# Patient Record
Sex: FEMALE | Race: WHITE
[De-identification: names, ages, dates, MRNs, and addresses within clinical notes are randomized per-mention and may not be internally consistent; named-entity substitution may affect disease eponyms.]

---

## 2020-02-15 ENCOUNTER — HOSPITAL ENCOUNTER (EMERGENCY)
Dept: HOSPITAL 50 - VM.ED | Age: 25
Discharge: HOME | End: 2020-02-15
Payer: COMMERCIAL

## 2020-02-15 VITALS — DIASTOLIC BLOOD PRESSURE: 78 MMHG | HEART RATE: 80 BPM | SYSTOLIC BLOOD PRESSURE: 114 MMHG

## 2020-02-15 DIAGNOSIS — F12.988: Primary | ICD-10-CM

## 2020-02-15 DIAGNOSIS — Z88.1: ICD-10-CM

## 2020-02-15 DIAGNOSIS — F17.210: ICD-10-CM

## 2020-02-15 DIAGNOSIS — R11.2: ICD-10-CM

## 2020-02-15 LAB
ANION GAP SERPL CALC-SCNC: 14.5 MMOL/L (ref 10–20)
CHLORIDE SERPL-SCNC: 103 MMOL/L (ref 98–107)
SODIUM SERPL-SCNC: 140 MMOL/L (ref 136–145)

## 2020-02-16 NOTE — CR
______________________________________________________________________________   

  

[id number] RAD/RAD Abd Flat and Upright 2V  

EXAM: ABDOMEN 2 VIEWS  

   

 INDICATION:  ABDOMINAL PAIN.  

   

 COMPARISON:  None.  

   

 DISCUSSION: A couple of mildly prominent small bowel loops in the left upper  

 quadrant which are nonspecific, but could be seen in the localized ileus. Gas is  

 seen throughout a normal-appearing colon. Follow-up radiographs may be useful if  

 symptoms persist.  

   

 No pathologic calcifications or osseous lesions are seen.   

   

 IMPRESSION:  

 1.  Nonspecific bowel gas pattern.  

   

 Electronically signed by Jose Cruz Corona MD on 2/16/2020 9:16 AM  

   

  

Jose Cruz Corona MD                 

 02/16/20 0918    

  

Thank you for allowing us to participate in the care of your patient.

## 2020-02-17 ENCOUNTER — HOSPITAL ENCOUNTER (EMERGENCY)
Dept: HOSPITAL 50 - VM.ED | Age: 25
Discharge: HOME | End: 2020-02-17
Payer: COMMERCIAL

## 2020-02-17 VITALS — HEART RATE: 70 BPM | SYSTOLIC BLOOD PRESSURE: 111 MMHG | DIASTOLIC BLOOD PRESSURE: 67 MMHG

## 2020-02-17 DIAGNOSIS — Z79.899: ICD-10-CM

## 2020-02-17 DIAGNOSIS — F12.988: Primary | ICD-10-CM

## 2020-02-17 DIAGNOSIS — F32.9: ICD-10-CM

## 2020-02-17 DIAGNOSIS — Z88.1: ICD-10-CM

## 2020-02-17 DIAGNOSIS — R11.2: ICD-10-CM

## 2020-02-17 DIAGNOSIS — F41.9: ICD-10-CM

## 2020-02-17 DIAGNOSIS — F17.210: ICD-10-CM

## 2020-02-17 LAB
ANION GAP SERPL CALC-SCNC: 15.5 MMOL/L (ref 10–20)
CHLORIDE SERPL-SCNC: 108 MMOL/L (ref 98–107)
SODIUM SERPL-SCNC: 142 MMOL/L (ref 136–145)

## 2020-02-17 PROCEDURE — 80053 COMPREHEN METABOLIC PANEL: CPT

## 2020-02-17 PROCEDURE — 83735 ASSAY OF MAGNESIUM: CPT

## 2020-02-17 PROCEDURE — 85610 PROTHROMBIN TIME: CPT

## 2020-02-17 PROCEDURE — 84100 ASSAY OF PHOSPHORUS: CPT

## 2020-02-17 PROCEDURE — 96376 TX/PRO/DX INJ SAME DRUG ADON: CPT

## 2020-02-17 PROCEDURE — 86140 C-REACTIVE PROTEIN: CPT

## 2020-02-17 PROCEDURE — 99284 EMERGENCY DEPT VISIT MOD MDM: CPT

## 2020-02-17 PROCEDURE — C9113 INJ PANTOPRAZOLE SODIUM, VIA: HCPCS

## 2020-02-17 PROCEDURE — 85025 COMPLETE CBC W/AUTO DIFF WBC: CPT

## 2020-02-17 PROCEDURE — 96361 HYDRATE IV INFUSION ADD-ON: CPT

## 2020-02-17 PROCEDURE — 96374 THER/PROPH/DIAG INJ IV PUSH: CPT

## 2020-02-17 PROCEDURE — 96375 TX/PRO/DX INJ NEW DRUG ADDON: CPT

## 2020-02-17 NOTE — EDM.PDOC
ED HPI GENERAL MEDICAL PROBLEM





- General


Chief Complaint: Gastrointestinal Problem


Stated Complaint: abdominal pain, nausea and vomitting 


Time Seen by Provider: 02/17/20 09:30


Source of Information: Reports: Patient, Family


History Limitations: Reports: No Limitations





- History of Present Illness


INITIAL COMMENTS - FREE TEXT/NARRATIVE: 





Patient presents to the ER from the clinic with hyperemesis. Patient states she 

smokes marijuana yesterday and started feeling nausea, forceful retching and 

abdominal pain. It has progressed and the patient states her abdominal pain is 

unbearable. The mother is present at bedside. Mother states patient was seen on 

Friday at the [facility] for the same symptoms. After being diagnosed with 

cannabinoid induced hyperemesis that patient still went home and smoked more 

marijuana. The patient has had an intensive workup for the nausea, vomiting and 

abdominal pain, including CT abdomen and pelvis and EGD. No acute or pathologic 

findings. Patient is very anxious and persistent on receiving IV narcotic pain 

medication. Patient denies fever, chills, chest pain, diarrhea, constipation, 

rash.  


Onset: Today


Onset Date: 02/16/20


Onset Time: 12:00


Duration: Getting Worse


Location: Reports: Abdomen


Quality: Reports: Ache, Stabbing


Severity: Moderate


Improves with: Reports: None


Worsens with: Reports: None


Associated Symptoms: Reports: Nausea/Vomiting


  ** Upper Abdominal


Pain Score (Numeric/FACES): 10





- Related Data


 Allergies











Allergy/AdvReac Type Severity Reaction Status Date / Time


 


azithromycin [From Zithromax] Allergy  Rash Verified 02/17/20 10:08











Home Meds: 


 Home Meds





ARIPiprazole [Aripiprazole] 2 mg DAILY 02/17/20 [History]


Escitalopram [Lexapro] 20 mg DAILY 02/17/20 [History]


Ondansetron [Zofran ODT] 4 mg Q4H PRN 02/17/20 [History]


hydrOXYzine HCL [hydrOXYzine] 25 mg QID PRN 02/17/20 [History]


polyethylene glycoL 3350 [MiraLAX] 3 tsp TID PRN 02/17/20 [History]











Past Medical History


Gastrointestinal History: Reports: GERD, Other (See Below)


Other Gastrointestinal History: cannaboid hyperemesis


Psychiatric History: Reports: Anxiety, Depression





- Past Surgical History


GI Surgical History: Reports: None





Social & Family History





- Tobacco Use


Smoking Status *Q: Current Every Day Smoker


Years of Tobacco use: 10


Packs/Tins Daily: 1





- Recreational Drug Use


Recreational Drug Use: Yes


Drug Use in Last 12 Months: Yes


Recreational Drug Type: Reports: Marijuana/Hashish


Recreational Drug Use Frequency: Daily





ED ROS GENERAL





- Review of Systems


Review Of Systems: See Below


Constitutional: Reports: No Symptoms, Decreased Appetite.  Denies: Fever, Chills

, Malaise, Weakness


HEENT: Reports: No Symptoms


Respiratory: Reports: No Symptoms.  Denies: Shortness of Breath, Wheezing, Cough

, Sputum


Cardiovascular: Reports: No Symptoms


Endocrine: Reports: No Symptoms


GI/Abdominal: Reports: Abdominal Pain, Nausea, Vomiting.  Denies: Black Stool, 

Constipation, Diarrhea, Distension, Flatus


: Reports: No Symptoms


Musculoskeletal: Reports: No Symptoms


Skin: Reports: No Symptoms.  Denies: Cyanosis, Jaundice, Rash


Neurological: Reports: No Symptoms.  Denies: Headache, Tremors, Trouble Speaking

, Weakness


Psychiatric: Reports: Agitation, Anxiety


Hematologic/Lymphatic: Reports: No Symptoms


Immunologic: Reports: No Symptoms





ED EXAM, GENERAL





- Physical Exam


Exam: See Below


Exam Limited By: No Limitations


General Appearance: Alert, Anxious, Moderate Distress


Eye Exam: Bilateral Eye: EOMI, PERRL


Ears: Normal External Exam, Normal Canal


Ear Exam: Bilateral Ear: Auricle Normal


Nose: Normal Inspection, Normal Mucosa, No Blood


Throat/Mouth: Normal Inspection, Normal Lips, Normal Teeth, Normal Oropharynx, 

No Airway Compromise


Head: Atraumatic, Normocephalic


Neck: Normal Inspection, Supple, Non-Tender, Full Range of Motion


Respiratory/Chest: No Respiratory Distress, Lungs Clear, Normal Breath Sounds, 

Chest Non-Tender


Cardiovascular: Normal Peripheral Pulses, Regular Rate, Rhythm, No Edema, No 

Murmur


Peripheral Pulses: 2+: Radial (L), Radial (R)


GI/Abdominal: Normal Bowel Sounds, Soft, Non-Tender, No Organomegaly, No 

Distention, No Abnormal Bruit, No Mass


 (Female) Exam: Deferred


Rectal (Female) Exam: Deferred


Back Exam: Normal Inspection, Full Range of Motion


Extremities: Normal Inspection, Normal Range of Motion, No Pedal Edema, Normal 

Capillary Refill


Neurological: Alert, Oriented, CN II-XII Intact, Normal Cognition, Normal Gait, 

Normal Reflexes, No Motor/Sensory Deficits


Psychiatric: Normal Affect, Anxious


Skin Exam: Warm, Dry, Intact, Normal Color, No Rash


Lymphatic: No Adenopathy





Course





- Vital Signs


Last Recorded V/S: 


 Last Vital Signs











Temp  36.1 C   02/17/20 09:25


 


Pulse  70   02/17/20 09:25


 


Resp  24 H  02/17/20 09:25


 


BP  111/67   02/17/20 09:25


 


Pulse Ox  97   02/17/20 09:25














- Orders/Labs/Meds


Orders: 


 Active Orders 24 hr











 Category Date Time Status


 


 DRUG SCREEN, URINE [URCHEM] Stat Lab  02/17/20 09:30 Ordered


 


 HCG URINE, POC [POC] Stat Lab  02/17/20 09:31 Ordered


 


 Peripheral IV Insertion Adult [OM.PC] Routine Oth  02/17/20 09:21 Ordered











Labs: 


 Laboratory Tests











  02/17/20 02/17/20 02/17/20 Range/Units





  09:35 09:35 09:35 


 


WBC  9.5    (4.0-10.0)  x10^3/uL


 


RBC  5.17    (4.00-5.50)  x10^6/uL


 


Hgb  15.1    (12.0-16.0)  g/dL


 


Hct  44.0    (33.0-47.0)  %


 


MCV  85.1    (78.0-93.0)  fL


 


MCH  29.2    (26.0-32.0)  pg


 


MCHC  34.3    (32.0-36.0)  g/dL


 


RDW Coeff of Aditya  12.4    (10.0-15.0)  %


 


Plt Count  300    (130-400)  x10^3/uL


 


Neut % (Auto)  69.6    (50.0-80.0)  %


 


Lymph % (Auto)  23.5 L    (25.0-50.0)  %


 


Mono % (Auto)  5.6    (2.0-11.0)  %


 


Eos % (Auto)  1.2    (0.0-4.0)  %


 


Baso % (Auto)  0.1 L    (0.2-1.2)  %


 


PT   10.9   (10.0-12.8)  SEC


 


INR   1.0 L   (2.0-3.5)  


 


Sodium    142  (136-145)  mmol/L


 


Potassium    3.5  (3.5-5.1)  mmol/L


 


Chloride    108 H  ()  mmol/L


 


Carbon Dioxide    22  (21-32)  mmol/L


 


Anion Gap    15.5  (10-20)  mmol/L


 


BUN    12  (7-18)  mg/dL


 


Creatinine    1.0  (0.55-1.02)  mg/dL


 


Est Cr Clr Drug Dosing    TNP  


 


Estimated GFR (MDRD)    > 60  


 


Glucose    100  ()  mg/dL


 


Calcium    9.2  (8.5-10.1)  mg/dL


 


Corrected Calcium    9.52  (8.5-10.1)  mg/dL


 


Phosphorus    2.7  (2.6-4.7)  mg/dL


 


Magnesium    2.0  (1.8-2.4)  mg/dL


 


Total Bilirubin    0.5  (0.2-1.0)  mg/dL


 


AST    19  (15-37)  U/L


 


ALT    24  (14-59)  U/L


 


Alkaline Phosphatase    117 H  ()  U/L


 


C-Reactive Protein    < 0.2  (<=0.9)  mg/dL


 


Total Protein    7.3  (6.4-8.2)  g/dL


 


Albumin    3.6  (3.4-5.0)  g/dL


 


Globulin    3.7  


 


Albumin/Globulin Ratio    0.97  











Meds: 


Medications














Discontinued Medications














Generic Name Dose Route Start Last Admin





  Trade Name Freq  PRN Reason Stop Dose Admin


 


Haloperidol Lactate  10 mg  02/17/20 09:29  02/17/20 09:41





  Haldol  IV  02/17/20 09:30  10 mg





  ONETIME ONE   Administration





     





     





     





     


 


Sodium Chloride  1,000 mls @ 1,000 mls/hr  02/17/20 09:23  02/17/20 09:39





  Normal Saline  IV  02/17/20 10:22  1,000 mls/hr





  .BOLUS ONE   Administration





     





     





     





     


 


Ketorolac Tromethamine  15 mg  02/17/20 09:57  02/17/20 10:07





  Toradol  IVPUSH  02/17/20 09:58  15 mg





  ONETIME ONE   Administration





     





     





     





     


 


Lorazepam  1 mg  02/17/20 09:29  02/17/20 09:39





  Ativan  IVPUSH  02/17/20 09:30  1 mg





  STAT ONE   Administration





     





     





     





     


 


Lorazepam  1 mg  02/17/20 10:58  02/17/20 11:05





  Ativan  IVPUSH  02/17/20 10:59  1 mg





  STAT ONE   Administration





     





     





     





     


 


Metoclopramide HCl  10 mg  02/17/20 10:57  02/17/20 11:08





  Reglan  IVPUSH  02/17/20 10:58  10 mg





  ONETIME ONE   Administration





     





     





     





     


 


Pantoprazole Sodium  80 mg  02/17/20 09:28  02/17/20 09:43





  Protonix Iv***  IVPUSH  02/17/20 09:29  80 mg





  ONETIME ONE   Administration





     





     





     





     


 


Prochlorperazine Edisylate  10 mg  02/17/20 09:26  





  Compazine  IV  02/17/20 09:27  





  ONETIME ONE   





     





     





     





     


 


Sodium Chloride  10 ml  02/17/20 09:21  





  Saline Flush  FLUSH   





  ASDIRECTED PRN   





  Keep Vein Open   





     





     





     














- Re-Assessments/Exams


Free Text/Narrative Re-Assessment/Exam: 





02/17/20 12:30


After IV medication, nausea and retching had improved, patient was sleeping


When patient would waken, then would start to retch





Departure





- Departure


Time of Disposition: 12:15


Disposition: Home, Self-Care 01


Clinical Impression: 


 Marijuana use





Nausea & vomiting


Qualifiers:


 Vomiting Intractability: intractable 








- Discharge Information


Instructions:  Cannabis Use Disorder


Referrals: 


PCP,Unknown [Primary Care Provider] - 


Forms:  ED Department Discharge


Additional Instructions: 


Stop using marijuana





Try to sleep this afternoon





Promethazine suppository 25mg


1 every 6 hours as needed for nausea/vomiting





Zofran ODT4mg


1 dissolved in mouth every 6 hours 





Sepsis Event Note





- Evaluation


Sepsis Screening Result: No Definite Risk





- Focused Exam


Vital Signs: 


 Vital Signs











  Temp Pulse Resp BP Pulse Ox


 


 02/17/20 09:25  36.1 C  70  24 H  111/67  97











Date Exam was Performed: 02/17/20


Time Exam was Performed: 14:07





- My Orders


Last 24 Hours: 


My Active Orders





02/17/20 09:21


Peripheral IV Insertion Adult [OM.PC] Routine 





02/17/20 09:30


DRUG SCREEN, URINE [URCHEM] Stat 





02/17/20 09:31


HCG URINE, POC [POC] Stat 














- Assessment/Plan


Last 24 Hours: 


My Active Orders





02/17/20 09:21


Peripheral IV Insertion Adult [OM.PC] Routine 





02/17/20 09:30


DRUG SCREEN, URINE [URCHEM] Stat 





02/17/20 09:31


HCG URINE, POC [POC] Stat 











Plan: 





Stop using marijuana





Try to sleep this afternoon





Promethazine suppository 25mg


1 every 6 hours as needed for nausea/vomiting





Zofran ODT4mg


1 dissolved in mouth every 6 hours

## 2020-02-19 ENCOUNTER — HOSPITAL ENCOUNTER (EMERGENCY)
Dept: HOSPITAL 50 - VM.ED | Age: 25
Discharge: HOME | End: 2020-02-19
Payer: COMMERCIAL

## 2020-02-19 VITALS — SYSTOLIC BLOOD PRESSURE: 131 MMHG | HEART RATE: 54 BPM | DIASTOLIC BLOOD PRESSURE: 84 MMHG

## 2020-02-19 DIAGNOSIS — F32.9: ICD-10-CM

## 2020-02-19 DIAGNOSIS — F17.210: ICD-10-CM

## 2020-02-19 DIAGNOSIS — Z79.899: ICD-10-CM

## 2020-02-19 DIAGNOSIS — Z88.1: ICD-10-CM

## 2020-02-19 DIAGNOSIS — F41.9: ICD-10-CM

## 2020-02-19 DIAGNOSIS — F12.988: Primary | ICD-10-CM

## 2020-02-19 NOTE — EDM.PDOC
ED HPI GENERAL MEDICAL PROBLEM





- General


Chief Complaint: Gastrointestinal Problem


Stated Complaint: vomiting


Time Seen by Provider: 02/19/20 04:13


Source of Information: Reports: Patient, Family


History Limitations: Reports: No Limitations





- History of Present Illness


INITIAL COMMENTS - FREE TEXT/NARRATIVE: 


Patient states she has had chronic nausea and vomiting ongoing now for over a 

week after being seen at multiple locations [facility] in [place] and here diagnosed 

with cannabinoid hyperemesis syndrome.  Patient states last time she smoked 

anything was Saturday she states she has been having ongoing vomiting all day 

today probably 15 times she has been trying Cokes crackers toast juice but not 

keeping anything down she says she has Phenergan suppositories at home and 

Zofran ODT but still having issues.  She denies any abdominal pain or diarrhea 

just a chronic nausea feeling and continuous vomiting.








She has had extensive work-up CT abdomen pelvis EGD GI consults multiple ER 

visits all of been negative she was seen here on 2 17 with a negative CBC 

negative hCG normal exam





Duration: Day(s):


Location: Reports: Abdomen


Quality: Reports: Other (No pain)


Associated Symptoms: Reports: Nausea/Vomiting


  ** Abdomen


Pain Score (Numeric/FACES): 3





- Related Data


 Allergies











Allergy/AdvReac Type Severity Reaction Status Date / Time


 


azithromycin [From Zithromax] Allergy  Rash Verified 02/19/20 03:58











Home Meds: 


 Home Meds





ARIPiprazole [Aripiprazole] 2 mg DAILY 02/17/20 [History]


Escitalopram [Lexapro] 20 mg DAILY 02/17/20 [History]


Ondansetron [Zofran ODT] 4 mg Q4H PRN 02/17/20 [History]


hydrOXYzine HCL [hydrOXYzine] 25 mg QID PRN 02/17/20 [History]


polyethylene glycoL 3350 [MiraLAX] 3 tsp TID PRN 02/17/20 [History]











Past Medical History


Gastrointestinal History: Reports: GERD, Other (See Below)


Other Gastrointestinal History: cannaboid hyperemesis


Psychiatric History: Reports: Anxiety, Depression





- Past Surgical History


GI Surgical History: Reports: None





Social & Family History





- Tobacco Use


Smoking Status *Q: Current Every Day Smoker


Years of Tobacco use: 8


Packs/Tins Daily: 1





ED ROS GENERAL





- Review of Systems


Review Of Systems: See Below


Constitutional: Reports: No Symptoms.  Denies: Fever, Chills, Malaise, Weakness

, Fatigue


HEENT: Reports: No Symptoms


Respiratory: Reports: No Symptoms.  Denies: Shortness of Breath, Cough


Cardiovascular: Reports: No Symptoms.  Denies: Lightheadedness, Palpitations, 

Syncope


Endocrine: Reports: No Symptoms


GI/Abdominal: Reports: Nausea, Vomiting (Patient states last time she vomited 

was about an hour ago), Other (Last bowel movement was 2 days ago).  Denies: 

Abdominal Pain, Bloody Stool, Constipation, Diarrhea


: Reports: No Symptoms


Musculoskeletal: Reports: No Symptoms


Skin: Reports: No Symptoms


Neurological: Reports: No Symptoms


Psychiatric: Reports: No Symptoms


Hematologic/Lymphatic: Reports: No Symptoms


Immunologic: Reports: No Symptoms





ED EXAM, GI/ABD





- Physical Exam


Exam: See Below


Exam Limited By: No Limitations


General Appearance: Alert, WD/WN, No Apparent Distress, Other (Patient sitting 

on the bed crosslegged no acute distress laughing with mother upon walking into 

the room)


Eyes: Bilateral: Normal Appearance


Ears: Hearing Grossly Normal


Nose: Normal Inspection, Normal Mucosa, No Blood


Throat/Mouth: Normal Inspection, Normal Lips, Normal Teeth, Normal Gums, Normal 

Oropharynx, Normal Voice, No Airway Compromise, Other (Moist mucous membranes 

normal skin turgor)


Head: Atraumatic, Normocephalic


Neck: Normal Inspection, Supple, Non-Tender, Full Range of Motion


Respiratory/Chest: No Respiratory Distress, Lungs Clear, Normal Breath Sounds, 

No Accessory Muscle Use, Chest Non-Tender


Cardiovascular: Normal Peripheral Pulses, Regular Rate, Rhythm, No Edema, No 

Gallop, No JVD, No Murmur, No Rub


GI/Abdominal Exam: Normal Bowel Sounds, Soft, Non-Tender, No Organomegaly, No 

Distention, Other (Patient has no tenderness palpation over the abdomen 

positive bowel sounds negative heel slap negative pelvic rock negative psoas 

negative obturator negative CVA tenderness palpation).  No: Guarding, Rigid, 

Rebound, Tender


Extremities: Normal Inspection, Normal Range of Motion, Non-Tender, No Pedal 

Edema, Normal Capillary Refill


Neurological: Alert, Oriented, CN II-XII Intact, Normal Cognition, Normal Gait, 

No Motor/Sensory Deficits, Other (All vital signs are stable she has a heart 

rate 54)


Psychiatric: Normal Affect, Normal Mood


Skin Exam: Warm, Dry, Intact, Normal Color, No Rash


Lymphatic: No Adenopathy





Course





- Vital Signs


Text/Narrative:: 





Records were reviewed from [facility] and here no acute findings were noted








Patient states she has Zofran ODT and Phenergan suppositories at home patient 

was instructed to drink Sprite Pedialyte Gatorade 7-Up ginger ale small amounts 

of fluids and advance as tolerated to eat crackers toast bananas advance as 

tolerated





Follow-up with your regular primary care provider in the next 24 to 48 hours


Last Recorded V/S: 





 Last Vital Signs











Temp  35.9 C L  02/19/20 03:59


 


Pulse  54 L  02/19/20 03:59


 


Resp  18   02/19/20 03:59


 


BP  131/84   02/19/20 03:59


 


Pulse Ox  100   02/19/20 03:59














Departure





- Departure


Time of Disposition: 04:30


Disposition: Home, Self-Care 01


Condition: Good


Clinical Impression: 


 Cannabinoid hyperemesis syndrome





Nausea & vomiting


Qualifiers:


 Vomiting Intractability: intractable 








- Discharge Information


*PRESCRIPTION DRUG MONITORING PROGRAM REVIEWED*: No


*COPY OF PRESCRIPTION DRUG MONITORING REPORT IN PATIENT KARI: No





Sepsis Event Note





- Evaluation


Sepsis Screening Result: No Definite Risk





- Focused Exam


Vital Signs: 





 Vital Signs











  Temp Pulse Resp BP Pulse Ox


 


 02/19/20 03:59  35.9 C L  54 L  18  131/84  100











Date Exam was Performed: 02/19/20


Time Exam was Performed: 04:19





- Problem List & Annotations


(1) Nausea & vomiting


SNOMED Code(s): 88198518


   Code(s): R11.2 - NAUSEA WITH VOMITING, UNSPECIFIED   Status: Acute   


Qualifiers: 


   Vomiting Intractability: intractable 





(2) Cannabinoid hyperemesis syndrome


SNOMED Code(s): 573156423, 766576689


   Code(s): F12.988 - CANNABIS USE, UNSP WITH OTHER CANNABIS-INDUCED DISORDER   

Status: Acute

## 2020-02-21 ENCOUNTER — HOSPITAL ENCOUNTER (EMERGENCY)
Dept: HOSPITAL 50 - VM.ED | Age: 25
LOS: 1 days | Discharge: HOME | End: 2020-02-22
Payer: COMMERCIAL

## 2020-02-21 VITALS — DIASTOLIC BLOOD PRESSURE: 95 MMHG | HEART RATE: 69 BPM | SYSTOLIC BLOOD PRESSURE: 135 MMHG

## 2020-02-21 DIAGNOSIS — F32.9: ICD-10-CM

## 2020-02-21 DIAGNOSIS — Z79.899: ICD-10-CM

## 2020-02-21 DIAGNOSIS — F41.9: ICD-10-CM

## 2020-02-21 DIAGNOSIS — K21.9: ICD-10-CM

## 2020-02-21 DIAGNOSIS — F12.188: Primary | ICD-10-CM

## 2020-02-21 DIAGNOSIS — R11.10: ICD-10-CM

## 2020-02-21 DIAGNOSIS — Z88.1: ICD-10-CM

## 2020-02-21 PROCEDURE — 99283 EMERGENCY DEPT VISIT LOW MDM: CPT

## 2020-02-21 PROCEDURE — 96372 THER/PROPH/DIAG INJ SC/IM: CPT

## 2020-02-21 NOTE — EDM.PDOC
ED HPI GENERAL MEDICAL PROBLEM





- General


Stated Complaint: Nausea, vomiting


Time Seen by Provider: 02/21/20 23:30


Source of Information: Reports: Patient, Family


History Limitations: Reports: No Limitations





- History of Present Illness


INITIAL COMMENTS - FREE TEXT/NARRATIVE: 





Patient presents to ER with complaints of intense nausea/vomiting and abdominal 

pain.  Has been having frequent issues like this for the last 2 months.  Has 

had multiple ER visits, [place] visits for same complaint.  She has had EGDs, 

CT scans, multiple lab tests.  Recently saw an internal medicine doctor and had 

a breath test for h. pylori. which was negative she states. The patient has 

been seen multiple times in the [place] ER for the same symptoms and ended up 

being discharged with cannabis hyperemesis and ended up going home and smoking 

more cannabis the same day. She returned to our ER shortly there after for 

similar symptoms again. Pt has been seen 3 times in this ER for similar issues 

and concerns.  Patient states last time she smoked anything was Saturday she 

states she has been having ongoing vomiting all day every day since then and 

estimates about 15 times per day.  She has been trying Cokes crackers toast 

juice but not keeping anything down she says she has Phenergan suppositories at 

home and Zofran ODT but still having issues. She does state she tries and takes 

large drinks for she is so thirsty at times and will end up becoming nauseated 

shortly after.  She states she also tries to induce vomiting when she does 

become nauseated. Mother states the patient was seen 2 times at the [facility] emergency room yesterday and was given Haldol and Benadryl the second 

visit with great relief and had not had any episodes of emesis for the last 12 

hours however started developing significant nausea and vomiting in the last 3 

hours.  Patient did take a suppository phenergan to try to help with symptoms.  

She also has been using a hot showers at least every hour.


Onset: Gradual


Quality: Reports: Ache, Throbbing


Severity: Moderate


Improves with: Reports: Other (warm shower )


Worsens with: Reports: Other (food, water, ), Movement


Treatments PTA: Reports: Other (see below) (Phenergan suppositories 9pm )





- Related Data


 Allergies











Allergy/AdvReac Type Severity Reaction Status Date / Time


 


azithromycin [From Zithromax] Allergy  Rash Verified 02/21/20 23:57











Home Meds: 


 Home Meds





ARIPiprazole [Aripiprazole] 2 mg DAILY 02/17/20 [History]


Escitalopram [Lexapro] 20 mg DAILY 02/17/20 [History]


Ondansetron [Zofran ODT] 4 mg Q4H PRN 02/17/20 [History]


hydrOXYzine HCL [hydrOXYzine] 25 mg QID PRN 02/17/20 [History]


polyethylene glycoL 3350 [MiraLAX] 3 tsp TID PRN 02/17/20 [History]


Ondansetron [Zofran ODT] 4 mg PO Q6H PRN 02/21/20 [History]


Promethazine [Phenadoz] 25 mg RECTAL Q6H PRN 02/21/20 [History]











Past Medical History


Gastrointestinal History: Reports: GERD, Other (See Below)


Other Gastrointestinal History: cannaboid hyperemesis


Psychiatric History: Reports: Anxiety, Depression





- Past Surgical History


GI Surgical History: Reports: None





ED ROS GENERAL





- Review of Systems


Review Of Systems: See Below


Constitutional: Reports: Decreased Appetite.  Denies: Fever, Chills, Malaise, 

Weakness, Night Sweats, Weight Gain


HEENT: Reports: No Symptoms


Respiratory: Reports: No Symptoms


Cardiovascular: Reports: No Symptoms


Endocrine: Reports: No Symptoms


GI/Abdominal: Reports: Abdominal Pain (muscle tenderness), Nausea, Vomiting.  

Denies: Black Stool, Bloody Stool, Constipation, Diarrhea, Decreased Appetite, 

Distension, Flatus, Hematemesis, Hematochezia, Melena, Mucous in Stool, Stool 

Incontinence


: Reports: No Symptoms


Musculoskeletal: Reports: No Symptoms


Skin: Reports: No Symptoms


Neurological: Reports: No Symptoms


Psychiatric: Reports: No Symptoms


Hematologic/Lymphatic: Reports: No Symptoms


Immunologic: Reports: No Symptoms





ED EXAM, GENERAL





- Physical Exam


Exam: See Below


Exam Limited By: No Limitations


General Appearance: Alert, WD/WN, No Apparent Distress


Eye Exam: Bilateral Eye: PERRL


Nose: Normal Inspection, Normal Mucosa


Throat/Mouth: Normal Inspection, Normal Lips


Head: Atraumatic, Normocephalic


Neck: Normal Inspection, Supple, Non-Tender, Full Range of Motion


Respiratory/Chest: No Respiratory Distress, Lungs Clear, Normal Breath Sounds, 

No Accessory Muscle Use, Chest Non-Tender


Cardiovascular: Normal Peripheral Pulses, Regular Rate, Rhythm, No Edema, No 

Murmur, No Rub


GI/Abdominal: Normal Bowel Sounds, Soft, Non-Tender, No Organomegaly, No 

Distention, No Abnormal Bruit, No Mass.  No: Distended, Guarding, Rigid, Rebound

, Abnormal Bowel Sounds, Hernia, Hepatomegaly, Splenomegaly


Back Exam: Normal Inspection, Full Range of Motion


Extremities: Normal Inspection, Normal Range of Motion, Non-Tender, No Pedal 

Edema, Normal Capillary Refill


Neurological: Alert, Oriented, Normal Gait


Psychiatric: Normal Affect, Normal Mood


Skin Exam: Warm, Dry, Intact, Normal Color





Course





- Orders/Labs/Meds


Meds: 


Medications














Discontinued Medications














Generic Name Dose Route Start Last Admin





  Trade Name Cruzitoq  PRN Reason Stop Dose Admin


 


Diphenhydramine HCl  50 mg  02/21/20 23:32  





  Benadryl  IM  02/21/20 23:33  





  ONETIME ONE   





     





     





     





     


 


Haloperidol Lactate  5 mg  02/21/20 23:32  





  Haldol  IM  02/21/20 23:33  





  STAT ONE   





     





     





     





     














Departure





- Departure


Time of Disposition: 00:00


Disposition: Home, Self-Care 01


Condition: Good


Clinical Impression: 


 Cannabis hyperemesis syndrome concurrent with and due to cannabis abuse, 

Cannabinoid hyperemesis syndrome








- Discharge Information


*PRESCRIPTION DRUG MONITORING PROGRAM REVIEWED*: Not Applicable


*COPY OF PRESCRIPTION DRUG MONITORING REPORT IN PATIENT KARI: Not Applicable


Instructions:  Substance Use Disorder and Mental Illness, Cannabinoid 

Hyperemesis Syndrome, Haloperidol injection, Diphenhydramine injection


Forms:  ED Department Discharge


Additional Instructions: 


1. rest 


2. increase your water intake slowly do not guzzle or drink large glasses 


3. Continue all at home medications


4. Follow up with PCP if symptoms continue, return, or progress 


5. Call with any questions or concerns 


6.  Breathe during nausea episodes


7.  Continue to use hot showers


8.  Can take Benadryl every 4 hours to help with nausea


9.  It is advise to seek out outpatient drug rehabilitation and treatment 

through the [facility] service Center further evaluation


10.  Activity and diet as tolerated


11.  Information regarding your illness and medications given today in the 

emergency department is provided in the packet





Sepsis Event Note





- Focused Exam


Date Exam was Performed: 02/21/20


Time Exam was Performed: 23:41





- Assessment/Plan


Assessment:: 





1.  Cannabis hyperemesis


Plan: 





1.  Haladol 5mg IM given in the ER to help with the nausea 


2. Benedryl 50mg IM given in the ER to help with the nausea


3.  Patient is advised to start taking oral intake very slowly and not to 

guzzle or chug large glasses of liquid


4.  It is also encouraged to continue to use warm showers to help with the 

hyperemesis


5.  Patient educated anti-about deep breathing during nausea episodes


6.  Patient is advised to not induce vomiting


7.  Patient and nursing staff was updated regarding the plan of care


8.  Education provided the patient regarding activity, diet, rest, over-the-

counter medication modalities, and follow-up care was provided


9.  Patient and family are agreeable to the above plan of care


10. All questions and concerns were addressed with the patient and family prior 

to discharge

## 2022-10-01 NOTE — EDM.PDOC
ED HPI GENERAL MEDICAL PROBLEM





- General


Chief Complaint: Abdominal Pain


Stated Complaint: NAUSIA


Time Seen by Provider: 02/15/20 21:10


Source of Information: Reports: Patient, Family


History Limitations: Reports: No Limitations





- History of Present Illness


INITIAL COMMENTS - FREE TEXT/NARRATIVE: 





Patient presents to ER with complaints of intense nausea/vomiting and abdominal 

pain.  Has been having frequent issues like this for the last 2 months.  Has 

had multiple ER visits, [facility] visits for same complaint.  She has had EGDs, 

CT scans, multiple lab tests.  Recently saw an internal medicine doctor and had 

a breath test for h. pylori.  Has not gotten the results yet from this.  She 

states the vomiting started again 2 hours ago.  Does not feel associated with 

eating as happens whether she eats or not but admits she "doesn't eat much as 

afraid to vomit".  Had a soft to loose stool earlier today although that is not 

unusual for her.  No fevers





Per [facility] chart, has had multiple lab tests, EGD, CT scan, ultrasounds 

without any positive results.  Has been diagnosed with cannabis induced 

hyperemesis from daily use.  She did not use today but admits to use yesterday.

  States that the internal medicine doctor she saw on Thursday doesn't 

necessarily agree with that diagnosis.


Onset: Today, Sudden


Duration: Hour(s):, Constant


Location: Reports: Abdomen


Quality: Reports: Sharp, Stabbing


Severity: Severe


Improves with: Reports: None


Associated Symptoms: Reports: Loss of Appetite, Nausea/Vomiting.  Denies: Chest 

Pain, Cough, Fever/Chills, Shortness of Breath


  ** Anterior Abdomen


Pain Score (Numeric/FACES): 10





- Related Data


 Allergies











Allergy/AdvReac Type Severity Reaction Status Date / Time


 


azithromycin [From Zithromax] Allergy  Rash Verified 02/15/20 21:17











Home Meds: 


 Home Meds





Norgestimate-Ethinyl Estradiol [Ortho Tri-Cyclen] 1 each PO DAILY 10/16/15 [

History]


clonazePAM [Clonazepam] 1 tab PO BID PRN 10/16/15 [History]











Past Medical History


Gastrointestinal History: Reports: GERD


Psychiatric History: Reports: Anxiety, Depression





- Past Surgical History


GI Surgical History: Reports: None





Social & Family History





- Tobacco Use


Smoking Status *Q: Current Every Day Smoker





- Recreational Drug Use


Recreational Drug Type: Reports: Marijuana/Hashish





ED ROS GENERAL





- Review of Systems


Review Of Systems: See Below


Constitutional: Reports: Weakness, Fatigue, Decreased Appetite.  Denies: Fever, 

Chills, Malaise


HEENT: Reports: No Symptoms


Respiratory: Denies: Shortness of Breath, Cough


Cardiovascular: Denies: Chest Pain, Edema, Lightheadedness


Endocrine: Denies: Fatigue


GI/Abdominal: Reports: Abdominal Pain, Nausea, Vomiting.  Denies: Constipation, 

Diarrhea


: Reports: No Symptoms


Musculoskeletal: Reports: No Symptoms


Skin: Reports: No Symptoms


Neurological: Reports: No Symptoms





ED EXAM, GI/ABD





- Physical Exam


Exam: See Below


Exam Limited By: No Limitations


General Appearance: Alert, WD/WN, Moderate Distress


Ears: Normal External Exam, Normal TMs


Nose: Normal Inspection, Normal Mucosa, No Blood


Throat/Mouth: Normal Inspection, Normal Oropharynx


Head: Normocephalic


Neck: Normal Inspection, Supple, Non-Tender


Respiratory/Chest: No Respiratory Distress, Lungs Clear, Normal Breath Sounds


Cardiovascular: Regular Rate, Rhythm


GI/Abdominal Exam: Normal Bowel Sounds, Soft, Tender


Extremities: Normal Inspection, No Pedal Edema


Neurological: Alert, Oriented


Skin Exam: Warm, Dry





Course





- Vital Signs


Last Recorded V/S: 


 Last Vital Signs











Temp  97.9 F   02/15/20 21:17


 


Pulse  80   02/15/20 21:17


 


Resp  16   02/15/20 21:17


 


BP  114/78   02/15/20 21:17


 


Pulse Ox  98   02/15/20 21:17














- Orders/Labs/Meds


Orders: 


 Active Orders 24 hr











 Category Date Time Status


 


 Abdomen 2V AP Flat Upright [CR] Stat Exams  02/15/20 21:31 Ordered


 


 DRUG SCREEN, URINE [URCHEM] Stat Lab  02/15/20 21:36 Ordered


 


 UA W/MICROSCOPIC [URIN] Stat Lab  02/15/20 21:31 Ordered











Labs: 


 Laboratory Tests











  02/15/20 02/15/20 Range/Units





  21:53 21:53 


 


WBC  12.4 H   (4.0-10.0)  x10^3/uL


 


RBC  5.03   (4.00-5.50)  x10^6/uL


 


Hgb  14.7   (12.0-16.0)  g/dL


 


Hct  43.6   (33.0-47.0)  %


 


MCV  86.7   (78.0-93.0)  fL


 


MCH  29.2   (26.0-32.0)  pg


 


MCHC  33.7   (32.0-36.0)  g/dL


 


RDW Coeff of Aditya  12.5   (10.0-15.0)  %


 


Plt Count  301   (130-400)  x10^3/uL


 


Neut % (Auto)  57.6   (50.0-80.0)  %


 


Lymph % (Auto)  33.0   (25.0-50.0)  %


 


Mono % (Auto)  7.4   (2.0-11.0)  %


 


Eos % (Auto)  1.8   (0.0-4.0)  %


 


Baso % (Auto)  0.2   (0.2-1.2)  %


 


Sodium   140  (136-145)  mmol/L


 


Potassium   3.5  (3.5-5.1)  mmol/L


 


Chloride   103  ()  mmol/L


 


Carbon Dioxide   26  (21-32)  mmol/L


 


Anion Gap   14.5  (10-20)  mmol/L


 


BUN   10  (7-18)  mg/dL


 


Creatinine   1.0  (0.55-1.02)  mg/dL


 


Est Cr Clr Drug Dosing   TNP  


 


Estimated GFR (MDRD)   > 60  


 


Glucose   98  ()  mg/dL


 


Calcium   8.7  (8.5-10.1)  mg/dL


 


Corrected Calcium   9.02  (8.5-10.1)  mg/dL


 


Total Bilirubin   0.2  (0.2-1.0)  mg/dL


 


AST   16  (15-37)  U/L


 


ALT   21  (14-59)  U/L


 


Alkaline Phosphatase   113  ()  U/L


 


C-Reactive Protein   < 0.2  (<=0.9)  mg/dL


 


Total Protein   7.3  (6.4-8.2)  g/dL


 


Albumin   3.6  (3.4-5.0)  g/dL


 


Globulin   3.7  


 


Albumin/Globulin Ratio   0.97  


 


Amylase   59  ()  U/L


 


Lipase   83  ()  U/L











Meds: 


Medications














Discontinued Medications














Generic Name Dose Route Start Last Admin





  Trade Name Freq  PRN Reason Stop Dose Admin


 


Diphenhydramine HCl  25 mg  02/15/20 21:38  02/15/20 21:54





  Benadryl  IVPUSH  02/15/20 21:39  25 mg





  ONETIME ONE   Administration





     





     





     





     


 


Haloperidol Lactate  5 mg  02/15/20 21:46  02/15/20 22:00





  Haldol  IV  02/15/20 21:47  5 mg





  ONETIME ONE   Administration





     





     





     





     


 


Hydromorphone HCl  1 mg  02/15/20 22:29  02/15/20 22:33





  Dilaudid  IVPUSH  02/15/20 22:30  1 mg





  ONETIME ONE   Administration





     





     





     





     


 


Sodium Chloride  1,000 mls @ 999 mls/hr  02/15/20 21:15  02/15/20 21:34





  Normal Saline  IV  02/15/20 22:15  999 mls/hr





  ONETIME ONE   Administration





     





     





     





     


 


Ketorolac Tromethamine  30 mg  02/15/20 21:46  02/15/20 21:51





  Toradol  IVPUSH  02/15/20 21:47  30 mg





  ONETIME ONE   Administration





     





     





     





     


 


Ondansetron HCl  4 mg  02/15/20 21:15  02/15/20 21:32





  Zofran  IVPUSH  02/15/20 21:16  4 mg





  ONETIME ONE   Administration





     





     





     





     














- Re-Assessments/Exams


Free Text/Narrative Re-Assessment/Exam: 





02/15/20 22:35


Patient has had multiple medications since admission here to include Zofran, 

Benadryl, Toradol, Haldol.  Continues to thrash around in pain, retching with 

emesis.   Dilaudid ordered.  





2250-Has finally settled down some, states pain is easing, does feel she could 

go home and sleep and hopefully will continue to feel better.  Long discussion 

held with mother about potential for this being caused by marijuana.  Will 

continue to follow up with internal med and GI but do stress the 

discontinuation of THC.  








Departure





- Departure


Time of Disposition: 22:51


Disposition: Home, Self-Care 01


Condition: Fair


Clinical Impression: 


 Cannabinoid hyperemesis syndrome





Nausea & vomiting


Qualifiers:


 Vomiting Intractability: intractable 








- Discharge Information


*PRESCRIPTION DRUG MONITORING PROGRAM REVIEWED*: No


*COPY OF PRESCRIPTION DRUG MONITORING REPORT IN PATIENT KARI: No


Forms:  ED Department Discharge


Additional Instructions: 


1.  Rest


2.  Zofran 4 mg every 6 hours as needed for nausea


3.  Toradol 10 mg every hours as needed for pain


4.   Follow up with GI/internal med for further direction.





Sepsis Event Note





- Evaluation


Sepsis Screening Result: No Definite Risk





- Focused Exam


Vital Signs: 


 Vital Signs











  Temp Pulse Resp BP Pulse Ox


 


 02/15/20 21:17  97.9 F  80  16  114/78  98











Date Exam was Performed: 02/15/20


Time Exam was Performed: 22:35





- My Orders


Last 24 Hours: 


My Active Orders





02/15/20 21:31


Abdomen 2V AP Flat Upright [CR] Stat 


UA W/MICROSCOPIC [URIN] Stat 





02/15/20 21:36


DRUG SCREEN, URINE [URCHEM] Stat 














- Assessment/Plan


Last 24 Hours: 


My Active Orders





02/15/20 21:31


Abdomen 2V AP Flat Upright [CR] Stat 


UA W/MICROSCOPIC [URIN] Stat 





02/15/20 21:36


DRUG SCREEN, URINE [URCHEM] Stat
117.9

## 2023-04-21 ENCOUNTER — HOSPITAL ENCOUNTER (EMERGENCY)
Dept: HOSPITAL 50 - VM.ED | Age: 28
LOS: 1 days | Discharge: HOME | End: 2023-04-22
Payer: COMMERCIAL

## 2023-04-21 DIAGNOSIS — R10.9: Primary | ICD-10-CM

## 2023-04-21 DIAGNOSIS — Z88.1: ICD-10-CM

## 2023-04-21 DIAGNOSIS — Z72.0: ICD-10-CM

## 2023-04-21 DIAGNOSIS — R11.2: ICD-10-CM

## 2023-04-21 PROCEDURE — 96375 TX/PRO/DX INJ NEW DRUG ADDON: CPT

## 2023-04-21 PROCEDURE — 82150 ASSAY OF AMYLASE: CPT

## 2023-04-21 PROCEDURE — 85025 COMPLETE CBC W/AUTO DIFF WBC: CPT

## 2023-04-21 PROCEDURE — 96376 TX/PRO/DX INJ SAME DRUG ADON: CPT

## 2023-04-21 PROCEDURE — 83690 ASSAY OF LIPASE: CPT

## 2023-04-21 PROCEDURE — 99284 EMERGENCY DEPT VISIT MOD MDM: CPT

## 2023-04-21 PROCEDURE — 80053 COMPREHEN METABOLIC PANEL: CPT

## 2023-04-21 PROCEDURE — 96366 THER/PROPH/DIAG IV INF ADDON: CPT

## 2023-04-21 PROCEDURE — 96365 THER/PROPH/DIAG IV INF INIT: CPT

## 2023-04-21 PROCEDURE — 86140 C-REACTIVE PROTEIN: CPT

## 2023-04-22 VITALS — HEART RATE: 73 BPM | DIASTOLIC BLOOD PRESSURE: 86 MMHG | SYSTOLIC BLOOD PRESSURE: 142 MMHG

## 2023-04-22 LAB
ANION GAP SERPL CALC-SCNC: 22 MMOL/L (ref 5–15)
CHLORIDE SERPL-SCNC: 101 MMOL/L (ref 98–107)
EGFRCR SERPLBLD CKD-EPI 2021: 80 ML/MIN (ref 60–?)
SODIUM SERPL-SCNC: 139 MMOL/L (ref 136–145)